# Patient Record
Sex: MALE | Race: WHITE
[De-identification: names, ages, dates, MRNs, and addresses within clinical notes are randomized per-mention and may not be internally consistent; named-entity substitution may affect disease eponyms.]

---

## 2019-11-30 ENCOUNTER — HOSPITAL ENCOUNTER (EMERGENCY)
Dept: HOSPITAL 62 - ER | Age: 54
Discharge: HOME | End: 2019-11-30
Payer: SELF-PAY

## 2019-11-30 VITALS — SYSTOLIC BLOOD PRESSURE: 190 MMHG | DIASTOLIC BLOOD PRESSURE: 112 MMHG

## 2019-11-30 VITALS — SYSTOLIC BLOOD PRESSURE: 178 MMHG | DIASTOLIC BLOOD PRESSURE: 88 MMHG

## 2019-11-30 DIAGNOSIS — M54.12: Primary | ICD-10-CM

## 2019-11-30 DIAGNOSIS — M54.2: ICD-10-CM

## 2019-11-30 DIAGNOSIS — F17.200: ICD-10-CM

## 2019-11-30 DIAGNOSIS — I10: ICD-10-CM

## 2019-11-30 PROCEDURE — 93005 ELECTROCARDIOGRAM TRACING: CPT

## 2019-11-30 PROCEDURE — 93010 ELECTROCARDIOGRAM REPORT: CPT

## 2019-11-30 PROCEDURE — 99283 EMERGENCY DEPT VISIT LOW MDM: CPT

## 2019-11-30 PROCEDURE — 71046 X-RAY EXAM CHEST 2 VIEWS: CPT

## 2019-11-30 PROCEDURE — 72125 CT NECK SPINE W/O DYE: CPT

## 2019-11-30 PROCEDURE — 96372 THER/PROPH/DIAG INJ SC/IM: CPT

## 2019-11-30 PROCEDURE — 72050 X-RAY EXAM NECK SPINE 4/5VWS: CPT

## 2019-11-30 NOTE — RADIOLOGY REPORT (SQ)
EXAM DESCRIPTION:  CHEST 2 VIEWS



COMPLETED DATE/TIME:  11/30/2019 7:44 am



REASON FOR STUDY:  left shoulder pain/ htn



COMPARISON:  None.



EXAM PARAMETERS:  NUMBER OF VIEWS: two views

TECHNIQUE: Digital Frontal and Lateral radiographic views of the chest acquired.

RADIATION DOSE: NA

LIMITATIONS: none



FINDINGS:  LUNGS AND PLEURA: No opacities, masses or pneumothorax. No pleural effusion.

MEDIASTINUM AND HILAR STRUCTURES: No masses or contour abnormalities.

HEART AND VASCULAR STRUCTURES: Heart normal size.  No evidence for failure.

BONES: No acute findings.

HARDWARE: None in the chest.

OTHER: No other significant finding.



IMPRESSION:  NO ACUTE RADIOGRAPHIC FINDING IN THE CHEST.



TECHNICAL DOCUMENTATION:  JOB ID:  [id number]

 2011 [employer]- All Rights Reserved



Reading location - IP/workstation name: [email or web address]

## 2019-11-30 NOTE — ER DOCUMENT REPORT
ED General





- General


Chief Complaint: High Blood Pressure


Stated Complaint: REPORTS NECK PAIN AND HIGH BLOOD PRESSURE


Time Seen by Provider: 11/30/19 07:10


Notes: 





54 year old smoker with 5 days of progressive and worsening left sided neck 

pain.  Woke up with it a bit sore and pain has increased and moved down left arm

into fingers at times.  No chest pain and no sob.  He has a h/o htn and is a 

current every day smoker.  Out of bp meds for 2 months.  Takes hctz normally.  

Taking alleve at home with out relief.   





- Related Data


Allergies/Adverse Reactions: 


                                        





No Known Allergies Allergy (Verified 11/30/19 15:10)


   











Past Medical History





- Social History


Smoking Status: Current Every Day Smoker


Frequency of alcohol use: Occasional


Family History: Reviewed & Not Pertinent


Patient has suicidal ideation: No


Patient has homicidal ideation: No





Review of Systems





- Review of Systems


Constitutional: No symptoms reported


EENT: No symptoms reported


Cardiovascular: No symptoms reported


Respiratory: No symptoms reported


Gastrointestinal: No symptoms reported


Genitourinary: No symptoms reported


Male Genitourinary: No symptoms reported


Musculoskeletal: See HPI, Muscle stiffness, Neck pain.  denies: No symptoms 

reported


Skin: No symptoms reported


Hematologic/Lymphatic: No symptoms reported


Neurological/Psychological: No symptoms reported





Physical Exam





- Vital signs


Vitals: 


                                        











Temp Pulse Resp BP Pulse Ox


 


 97.4 F   93   18   196/120 H  96 


 


 11/30/19 04:59  11/30/19 04:59  11/30/19 04:59  11/30/19 04:59  11/30/19 04:59











Interpretation: Normal





- General


General appearance: Alert.  No: Appears well


In distress: Moderate - hold head leaning to right.  In pain.





- HEENT


Head: Normocephalic, Atraumatic


Eyes: Normal


Pupils: PERRL





- Respiratory


Respiratory status: No respiratory distress


Chest status: Nontender


Breath sounds: Normal


Chest palpation: Normal





- Cardiovascular


Rhythm: Regular


Heart sounds: Normal auscultation


Murmur: No





- Abdominal


Inspection: Normal


Distension: No distension


Bowel sounds: Normal


Tenderness: Nontender


Organomegaly: No organomegaly





- Back


Back: Normal, Nontender





- Extremities


General upper extremity: Normal inspection, Nontender, Normal color, Normal ROM,

Normal temperature


General lower extremity: Normal inspection, Nontender, Normal color, Normal ROM,

Normal temperature, Normal weight bearing.  No: Tracie's sign





- Neurological


Neuro grossly intact: Yes


Cognition: Normal


Orientation: AAOx4


Marion Coma Scale Eye Opening: Spontaneous


Gabe Coma Scale Verbal: Oriented


Gabe Coma Scale Motor: Obeys Commands


Marion Coma Scale Total: 15


Speech: Normal


Motor strength normal: LUE, RUE, LLE, RLE


Sensory: Normal





- Psychological


Associated symptoms: Normal affect, Normal mood





- Skin


Skin Temperature: Warm


Skin Moisture: Dry


Skin Color: Normal





Course





- Re-evaluation


Re-evalutation: 





11/30/19 08:43


MDM  54 year old in moderate/ severe pain with left sided radicular neck pain 

and x ray of neck that is consistent with this.  Feels a bit better after meds 

here.  EKG shows no acute appearing abnormality and although pt is not without 

risks - htn that he does not currently treat and tob use - this is clearly 

musculoskeletal.  We discussed follow up and he expressed understanding.  Will 

restart low dose hctz too. 





- Vital Signs


Vital signs: 


                                        











Temp Pulse Resp BP Pulse Ox


 


 98.5 F   78   18   178/88 H  97 


 


 11/30/19 09:09  11/30/19 09:09  11/30/19 09:09  11/30/19 09:09  11/30/19 09:09














Discharge





- Discharge


Clinical Impression: 


 Cervical pain (neck)





Radiculopathy


Qualifiers:


 Spinal region: cervical Qualified Code(s): M54.12 - Radiculopathy, cervical 

region





Hypertension


Qualifiers:


 Hypertension type: essential hypertension Qualified Code(s): I10 - Essential 

(primary) hypertension





Condition: Good


Disposition: HOME, SELF-CARE


Instructions:  Neck Injury (Cervical Strain) ([facility])


Additional Instructions: 


Stop smoking.  See your doctor in follow up.  Please return here for any 

problems or concerns. 


Prescriptions: 


Methylprednisolone [Medrol Dosepack (4 mg/Tab) 21 Tab/Dosepak] 4 mg PO ASDIR PRN

#1 tab.ds.pk


 PRN Reason: 


Diazepam [Valium 5 mg Tablet] 5 mg PO TID #15 tablet


Forms:  Elevated Blood Pressure, Smoking Cessation Education

## 2019-11-30 NOTE — ER DOCUMENT REPORT
ED Medical Screen (RME)





- General


Chief Complaint: Neck Problem


Stated Complaint: NECK PAIN/PINCHED NERVE


Time Seen by Provider: 11/30/19 15:10


Mode of Arrival: Ambulatory


Information source: Patient


Notes: 





Patient is a 54-year-old male presenting to the emergency department for the 

second time today with complaints of neck pain.  Patient reports discharge this 

morning with a nerve impingement.  He states he was prescribed Valium.  He took 

10 mg of Valium approximately 1 hour prior to arrival.  He states he has had 

severe pain since discharge, states that the medications are not working.





Exam:


Limited range of motion to the neck specifically when patient turns towards the 

right.





I have greeted and performed a rapid initial assessment of this patient.  A 

comprehensive ED assessment and evaluation of the patient, analysis of test 

results and completion of the medical decision making process will be conducted 

by additional ED providers.  I have specifically instructed the patient or 

family members with the patient to immediately return to any nursing staff 

should anything change in the patient's condition or with their chief complaint.





This medical record was dictated with voice recognizing software.  There may be 

grammatical, syntax errors that are unintended.








- Related Data


Allergies/Adverse Reactions: 


                                        





No Known Allergies Allergy (Verified 11/30/19 15:10)


   











Physical Exam





- Vital signs


Vitals: 





                                        











Temp Pulse Resp BP Pulse Ox


 


 97.1 F   88   24 H  169/148 H  95 


 


 11/30/19 15:01  11/30/19 15:01  11/30/19 15:01  11/30/19 15:01  11/30/19 15:01














Course





- Vital Signs


Vital signs: 





                                        











Temp Pulse Resp BP Pulse Ox


 


 97.1 F   88   24 H  169/148 H  95 


 


 11/30/19 15:01  11/30/19 15:01  11/30/19 15:01  11/30/19 15:01  11/30/19 15:01

## 2019-11-30 NOTE — RADIOLOGY REPORT (SQ)
EXAM DESCRIPTION:  CT CERVICAL SPINE WITHOUT



COMPLETED DATE/TIME:  11/30/2019 3:35 pm



REASON FOR STUDY:  severe neck pain, L side



COMPARISON:  None.



TECHNIQUE:  Axial images acquired through the cervical spine without intravenous contrast.  Images re
viewed with lung, soft tissue and bone windows.  Reconstructed coronal and sagittal MPR images review
ed.  Images stored on PACS.

All CT scanners at this facility use dose modulation, iterative reconstruction, and/or weight based d
osing when appropriate to reduce radiation dose to as low as reasonably achievable (ALARA).

[facility]: Dose Right  [facility]: CareDose    [facility]: Dose Right    [facility]: Teradose 4D    [facility]: Smart Cardback



RADIATION DOSE:  CT Rad equipment meets quality standard of care and radiation dose reduction techniq
ues were employed. CTDIvol: 20.2 mGy. DLP: 491 mGy-cm. mGy.



LIMITATIONS:  None.



FINDINGS:  ALIGNMENT: Anatomic.

MINERALIZATION: Normal.

VERTEBRAL BODIES: No fractures or dislocation.

DISCS: Multilevel disc space narrowing with osteophytes.

FACETS, LATERAL MASSES, POSTERIOR ELEMENTS: Facet arthropathy.  No fractures.  No dislocation.  No ac
[place] findings.

HARDWARE: None in the spine.

VISUALIZED RIBS: No fractures.

LUNG APICES AND SOFT TISSUES: No significant or acute findings.

OTHER: No other significant finding.



IMPRESSION:  CHRONIC DEGENERATIVE CHANGES.  NO ACUTE FINDINGS.



TECHNICAL DOCUMENTATION:  JOB ID:  [id number]

Quality ID # [id number]: Final reports with documentation of one or more dose reduction techniques (e.g., Au
tomated exposure control, adjustment of the mA and/or kV according to patient size, use of iterative 
reconstruction technique)

 2011 [employer]- All Rights Reserved



Reading location - IP/workstation name: [email or web address]

## 2019-11-30 NOTE — RADIOLOGY REPORT (SQ)
EXAM DESCRIPTION:  CERV SP 4 OR 5 VIEWS



COMPLETED DATE/TIME:  11/30/2019 7:44 am



REASON FOR STUDY:  neck pain



COMPARISON:  None.



NUMBER OF VIEWS:  Five views.



TECHNIQUE:  AP, lateral, obliques and odontoid radiographic images acquired of the cervical spine.



LIMITATIONS:  Nonstandard radiographic positioning.  The neck is held in flexion on the lateral view,
 and in convex leftward curvature on the frontal view.



FINDINGS:  MINERALIZATION: Normal.

ALIGNMENT: No grows pathologic subluxation. Nonstandard radiographic positioning.  The neck is held i
n flexion on the lateral view, and in convex leftward curvature on the frontal view.

VERTEBRAE: Vertebral bodies of normal height.

DISCS: Disc space loss of height with anterior osteophyte formation at C3-4, C4-5, C5-6, and C6-7

FORAMINA: On the right side, there is moderate to high-grade C3-4 foraminal narrowing, and moderate C
5-6 foraminal narrowing.

On the left side, mild foraminal narrowing is present at C3-4, C4-5, and C6-7.  Moderate left foramin
al narrowing at C5-6.

LATERAL AND POSTERIOR ELEMENTS: Facets, lateral masses and spinous processes without significant find
ings.

HARDWARE: None in the spine.

SOFT TISSUES: No masses or calcifications. Lung apices clear.

OTHER: No other significant finding.



IMPRESSION:  Diffuse degenerative changes.  Multilevel foraminal narrowing.



TECHNICAL DOCUMENTATION:  JOB ID:  [id number]

 2011 [email or web address]- All Rights Reserved



Reading location - IP/workstation name: [email or web address]

## 2019-11-30 NOTE — ER DOCUMENT REPORT
ED General





- General


Chief Complaint: Neck Injury


Stated Complaint: NECK PAIN/PINCHED NERVE


Time Seen by Provider: 11/30/19 15:10


Mode of Arrival: Ambulatory


TRAVEL OUTSIDE OF THE [place] IN LAST 30 DAYS: No





- Related Data


Allergies/Adverse Reactions: 


                                        





No Known Allergies Allergy (Verified 11/30/19 15:10)


   








Home Medications: VA[facility]





Past Medical History





- General


Information source: Patient





- Social History


Smoking Status: Current Every Day Smoker


Chew tobacco use (# tins/day): No


Frequency of alcohol use: None


Drug Abuse: None


Family History: Reviewed & Not Pertinent


Patient has suicidal ideation: No


Patient has homicidal ideation: No





- Past Medical History


Cardiac Medical History: Reports: Hx Hypertension





Physical Exam





- Vital signs


Vitals: 


                                        











Temp Pulse Resp BP Pulse Ox


 


 97.1 F   88   24 H  169/148 H  95 


 


 11/30/19 15:01  11/30/19 15:01  11/30/19 15:01  11/30/19 15:01  11/30/19 15:01














- Notes


Notes: 





Patient presents emergency department complaining of neck pain is been going on 

for the past 5 days.  Says the pain came on gradually got progressively worse.  

Rating down his left arm.  Denies any trauma falls or heavy lifting.  Occasional

pain in his neck before after lifting weights but it usually resolves within a 

day.  Had any x-rays of his neck.  He says the pain gets worse with movement.  

He denies any numbness or weakness in the arms or legs chest pain shortness of 

breath fevers cough nausea vomiting or abdominal pain no headaches or abnormal 

vision





Patient was seen here earlier today at his x-rays of his chest and neck that 

showed degenerative changes of the neck treated with Valium and discharged home 

but says the pain is getting worse





Patient also reports that he has a history of hypertension is been off his meds 

for about 1 to 2 months.





Past medical history is significant for hypertension.





Social history does smoke and occasional alcohol patient was out of town and is 

visiting for the holidays will be returning on Monday





Review of systems pertinent positives and negatives in HPI otherwise all the 

systems were reviewed and acutely negative








PHYSICIAN EXAM -vital signs are noted triage note and note from triage reviewed


 


GENERAL: Well-appearing, well-nourished and in __mild distress____


 


HEAD: Atraumatic, normocephalic.


 


EYES: Pupils equal round and reactive to light, extraocular movements intact, no

nystagmus or photophobia sclera anicteric, conjunctiva are normal.


 


ENT: nares patent, oropharynx clear without exudates.  Moist mucous membranes.  

Good gag reflex face is nontender


 


NECK:  supple without lymphadenopathy minimal tenderness in the midline no step-

off.  There is tenderness extending into the trapezius muscles bilaterally.  He 

has decreased range of motion secondary to pain


 


LUNGS: Breath sounds clear to auscultation bilaterally and equal.  No wheezes 

rales or rhonchi.


 


HEART: Regular rate and rhythm without murmurs


 


ABDOMEN: Soft, nontender, normoactive bowel sounds. 


 


EXTREMITIES: No deformity, no  edema. 


 


NEUROLOGICAL: Alert and oriented x4.  Cranial nerves he has symmetrical smile 

facies and shoulder shrug.  His motor strength is 5/5 bilaterally in the upper 

and lower extremities.  Toes downgoing.  Sensation is intact to light touch and 

pinprick in the upper lower extremities is a negative Romberg and normal gait 

motor function in the left upper extremity is 5/5 including flexion extension of

the elbow wrist and interosseous muscles


 


PSYCH: Normal mood, normal affect.


 


SKIN: Warm, Dry, normal turgor, no rashes or lesions noted.





BACK-nontender in the midline








Differential diagnosis radiculopathy fracture strain





Course





- Re-evaluation


Re-evalutation: 





11/30/19 17:35


ED patient is remained stable he was given Percocet with some improvement of his

pain his blood pressure has improved also.  Patient has no evidence of endorgan 

damage and has been off his medicines for 2 months.  There is no indication to 

acutely lower his blood pressure started on his medicine today











Medical decision making patient presents with a cervical radiculopathy.  He has 

no neurological deficits pain is improved at this point I think he be discharged

home.  Discussed the findings with patient.  Emphasized need for him to follow-

up in 1 week to get his blood pressure rechecked again and further evaluation of

his neck with possibly an MRI.  Rest and drink plenty of fluids.  He will be 

given a short course of narcotics and warned about the side effects of 

medication I emphasized need for him for follow-up for long-term care of his 

neck and his blood pressure he will be given a copy of his CT





I discussed results of laboratory findings and diagnostic test with 

patient/family.  The treatment plan was explained and I reviewed the discharge 

instructions with them.  Questions were answered.  The patient/family verbalizes

understanding





- Vital Signs


Vital signs: 


                                        











Temp Pulse Resp BP Pulse Ox


 


 97.1 F   88   24 H  173/110 H  95 


 


 11/30/19 15:01  11/30/19 15:01  11/30/19 15:01  11/30/19 16:21  11/30/19 15:01














- Diagnostic Test


Radiology reviewed: Reports reviewed





Discharge





- Discharge


Clinical Impression: 


 Cervical pain (neck)





Radiculopathy


Qualifiers:


 Spinal region: cervical Qualified Code(s): M54.12 - Radiculopathy, cervical 

region





Hypertension


Qualifiers:


 Hypertension type: essential hypertension Qualified Code(s): I10 - Essential 

(primary) hypertension





Disposition: HOME, SELF-CARE


Instructions:  Radiculopathy ([facility])


Additional Instructions: 





Please review the discharge instructions, they will tell you about your 

disease/injury and what you need to return to the ED for





Return to the ED if you feel worse or can follow-up with your family doctor





Follow-up with your family doctor in 2 to 3 days





Rest and avoid heavy lifting








The pain medications may cause drowsiness.  Be careful if you are using 

crutches.  Do not drive or operate machinery.  Do not take Tylenol with the pain

medication





Your blood pressure was elevated today needs to be rechecked again in 1 to 2 

weeks to determine if need to be on medication or have your medications 

adjusted.  Untreated hypertension can cause heart attack stroke and kidney 

failure





Is extremely important that you start taking your blood pressure medicines on a 

regular basis and have it rechecked again in 1 week





Prescriptions: 


Hydrocodone/Acetaminophen [Norco 5-325 Tablet] 1 each PO Q6H PRN #12 tablet


 PRN Reason: 


Forms:  Elevated Blood Pressure, Smoking Cessation Education